# Patient Record
Sex: MALE | Race: WHITE | URBAN - METROPOLITAN AREA
[De-identification: names, ages, dates, MRNs, and addresses within clinical notes are randomized per-mention and may not be internally consistent; named-entity substitution may affect disease eponyms.]

---

## 2017-02-14 ENCOUNTER — TELEPHONE (OUTPATIENT)
Dept: GASTROENTEROLOGY | Facility: CLINIC | Age: 27
End: 2017-02-14

## 2017-02-14 NOTE — TELEPHONE ENCOUNTER
See below recommendations. Patient has not followed up. Would you like to cancel orders? If you would like patient to complete, a new order must be placed.                    Nisa Luna RN at 11/24/2015  5:45 PM      Status: Signed :  Ryan Earl,